# Patient Record
Sex: FEMALE | Race: WHITE | NOT HISPANIC OR LATINO | URBAN - METROPOLITAN AREA
[De-identification: names, ages, dates, MRNs, and addresses within clinical notes are randomized per-mention and may not be internally consistent; named-entity substitution may affect disease eponyms.]

---

## 2022-10-17 ENCOUNTER — OFFICE (OUTPATIENT)
Dept: URBAN - METROPOLITAN AREA CLINIC 105 | Facility: CLINIC | Age: 82
End: 2022-10-17

## 2022-10-17 VITALS
WEIGHT: 146 LBS | HEART RATE: 71 BPM | OXYGEN SATURATION: 98 % | HEIGHT: 63 IN | SYSTOLIC BLOOD PRESSURE: 109 MMHG | DIASTOLIC BLOOD PRESSURE: 61 MMHG

## 2022-10-17 DIAGNOSIS — K59.09 OTHER CONSTIPATION: ICD-10-CM

## 2022-10-17 DIAGNOSIS — K21.9 GASTRO-ESOPHAGEAL REFLUX DISEASE WITHOUT ESOPHAGITIS: ICD-10-CM

## 2022-10-17 PROCEDURE — 99204 OFFICE O/P NEW MOD 45 MIN: CPT

## 2022-10-17 RX ORDER — FAMOTIDINE 40 MG/1
40 TABLET, FILM COATED ORAL
Qty: 90 | Refills: 0 | Status: ACTIVE

## 2022-10-17 RX ORDER — SUCRALFATE ORAL 1 G/10ML
SUSPENSION ORAL
Qty: 400 | Refills: 4 | Status: COMPLETED
End: 2023-12-12

## 2022-10-17 NOTE — SERVICEHPINOTES
Sally Soliz   is seen for an initial visit today. Patient presents for evaluation of GERD and stomach issues. She has taken omeprazole for 4 to 5 years. She recently moved from [place] 3/2022. She did not feel like the medicine was working and she was switched to pantoprazole. She also tried different pill manufacturers without much improvement her symptoms. She previously received her GI care in [place], [place] with Dr. Villar. She has lost approximately 5 pounds in the last 2 weeks. She is currently taking pantoprazole daily with breakfast. She also takes over-the-counter Pepcid with lunch and dinner. She is also taking Carafate 4 times per day. She reports having acid burning sensation in her throat. She denies heartburn or dysphagia. She does endorse decreased appetite. She denies blood in stool or black stool. She does not use NSAIDs. She denies regurgitation or hoarseness. Occasionally with cough and runny nose. Also with soreness in the morning after eating. Some nights her symptoms are more severe than others. Symptoms are worse with lying flat. Also with a history of constipation for which she has tried MiraLAX and prune juice. She has previously been told that she has irritable bowel syndrome. She denies abdominal pain or bloating. She reports having severe diarrhea in 2020 and had an upper endoscopy and colonoscopy performed at that time (report unavailable for review). She was treated with rifaximin with significant improvement in her symptoms. She reports having a back surgery after a fall earlier this year. She denies family history of colon cancer or celiac disease. She has a granddaughter with Crohn's disease.br
brPatient was seen by Rohini Ramos 10/11/2022 complaining of worsening GERD symptoms. She had switched from omeprazole to pantoprazole with no significant relief. Also with chronic constipation. She was advised to take sucralfate liquid 4 times daily. She also had H. pylori testing that was reportedly negative.br

## 2022-10-17 NOTE — SERVICENOTES
– Ordered upper endoscopy with Bravo pH testing at [facility]
– Continue pantoprazole daily, 30 to 60 minutes before breakfast
– Sent in prescription for famotidine (Pepcid) 40 mg at bedtime for reflux
– Refilled sucralfate 10 mL 4 times daily as needed for heartburn
– Hold pantoprazole, famotidine and sucralfate 1 week before your procedure
– Take a fiber supplement daily
– Increase fiber and fluid intake
– Try Squatty Potty
– Continue MiraLAX for constipation, adjust dose for 1-2 soft bowel movements daily
– We will obtain records from your prior colonoscopy and upper endoscopy

## 2022-11-18 ENCOUNTER — ON CAMPUS - OUTPATIENT (OUTPATIENT)
Dept: URBAN - METROPOLITAN AREA MEDICAL CENTER 8 | Facility: MEDICAL CENTER | Age: 82
End: 2022-11-18
Payer: MEDICARE

## 2022-11-18 DIAGNOSIS — K44.9 DIAPHRAGMATIC HERNIA WITHOUT OBSTRUCTION OR GANGRENE: ICD-10-CM

## 2022-11-18 DIAGNOSIS — K21.00 GASTRO-ESOPHAGEAL REFLUX DISEASE WITH ESOPHAGITIS, WITHOUT B: ICD-10-CM

## 2022-11-18 DIAGNOSIS — K31.7 POLYP OF STOMACH AND DUODENUM: ICD-10-CM

## 2022-11-18 DIAGNOSIS — K22.2 ESOPHAGEAL OBSTRUCTION: ICD-10-CM

## 2022-11-18 DIAGNOSIS — K31.89 OTHER DISEASES OF STOMACH AND DUODENUM: ICD-10-CM

## 2022-11-18 DIAGNOSIS — K22.5 DIVERTICULUM OF ESOPHAGUS, ACQUIRED: ICD-10-CM

## 2022-11-18 PROCEDURE — 43239 EGD BIOPSY SINGLE/MULTIPLE: CPT

## 2022-11-18 PROCEDURE — 43450 DILATE ESOPHAGUS 1/MULT PASS: CPT | Mod: 51

## 2023-06-01 ENCOUNTER — OFFICE (OUTPATIENT)
Dept: URBAN - METROPOLITAN AREA CLINIC 105 | Facility: CLINIC | Age: 83
End: 2023-06-01

## 2023-10-11 ENCOUNTER — OFFICE (OUTPATIENT)
Dept: URBAN - METROPOLITAN AREA CLINIC 105 | Facility: CLINIC | Age: 83
End: 2023-10-11

## 2023-10-11 VITALS
DIASTOLIC BLOOD PRESSURE: 80 MMHG | WEIGHT: 148 LBS | SYSTOLIC BLOOD PRESSURE: 130 MMHG | HEIGHT: 63 IN | HEART RATE: 100 BPM | OXYGEN SATURATION: 95 %

## 2023-10-11 DIAGNOSIS — K59.09 OTHER CONSTIPATION: ICD-10-CM

## 2023-10-11 DIAGNOSIS — K44.9 DIAPHRAGMATIC HERNIA WITHOUT OBSTRUCTION OR GANGRENE: ICD-10-CM

## 2023-10-11 DIAGNOSIS — K21.9 GASTRO-ESOPHAGEAL REFLUX DISEASE WITHOUT ESOPHAGITIS: ICD-10-CM

## 2023-10-11 PROCEDURE — 99214 OFFICE O/P EST MOD 30 MIN: CPT

## 2023-10-11 RX ORDER — SUCRALFATE 1 G/1
TABLET ORAL
Qty: 60 | Refills: 9 | Status: ACTIVE
Start: 2023-10-11

## 2023-10-11 NOTE — SERVICENOTES
we will hold esomeprazole for one week--see if less dizzy, if reflux worsens, can take famotidine twice  a day.

If still dizzy, restart esomeprazole and hold famotidine, see if less dizzy

You may need to let your PCP know
I will send for sucralfate tablets instead of liquid, this should be more affordable.

## 2023-10-11 NOTE — SERVICEHPINOTES
Sally Soliz   is seen today for a follow-up visit.  
br   OV 6/1/23 Dr. Antunez 
brPatient is an 82-year-old woman who presents today for follow-up of GERD and hiatal hernia.She has reflux symptoms, which are well controlled on famotidine, pantoprazole, and sucralfate. Her stool is on the looser side and takes MiraLAX every other day. She has not tried fiber supplements. She has history of osteoporosis and is receiving injections. She has had 5 back surgeries. She denies any changes in health history since last seen. Denies dysphagia. Denies diarrhea. Denies blood in stool or black stool. Denies family history of colon cancerPatient presents for follow-up of GERD and hiatal hernia. Patient initially seen in clinic 10/2022. At that time she had reported issues with GERD. She was taking pantoprazole daily with breakfast and OTC Pepcid with lunch and dinner. Also with a history of constipation for which she had tried MiraLAX and prune juice. She was previously told that she had irritable bowel syndrome. Patient was advised to continue pantoprazole daily and to take famotidine 40 mg at bedtime. Also refilled sucralfate 4 times daily. She was advised to take a fiber supplement, try squatty potty and continue MiraLAX for constipation. She had an EGD 11/2022 with LA grade C esophagitis, 1 benign stricture at the GE junction that was dilated with a 50 Maori dilator, 7 cm hiatal hernia, multiple gastric polyps, mild antral erythema, small diverticulum in the duodenal bulb and otherwise normal duodenum. Her pantoprazole was increased to 40 mg twice daily and she was advised to take famotidine 40 minutes at bedtime. She was also advised to continue sucralfate 4 times daily. 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 She was referred to Dr. Mckinney for possible hiatal hernia repair. She was minimally symptomatic at that time and wished to defer surgeryINTERVAL VISIT 10/11/23 
br   Patient states she is taking esomeprazole twice a day, sucralfate twice a day liquid, famotidine at bedtime and she bought a wedge pillow. She is pretty well controlled in regards to her reflux but has noticed since she has been on these medicines she has been dizzy from 11 AM until about 5 PM. She is wondering if these medicines are the trigger for her dizziness. She is also worried due to her history of osteoporosis and PPIs. And she is concerned because the box on her medications states do not take longer than 2 weeks.

## 2023-12-12 ENCOUNTER — OFFICE (OUTPATIENT)
Dept: URBAN - METROPOLITAN AREA CLINIC 105 | Facility: CLINIC | Age: 83
End: 2023-12-12

## 2023-12-12 VITALS
WEIGHT: 148 LBS | DIASTOLIC BLOOD PRESSURE: 60 MMHG | SYSTOLIC BLOOD PRESSURE: 111 MMHG | HEART RATE: 79 BPM | HEIGHT: 63 IN | OXYGEN SATURATION: 97 %

## 2023-12-12 DIAGNOSIS — R19.7 DIARRHEA, UNSPECIFIED: ICD-10-CM

## 2023-12-12 PROCEDURE — 99214 OFFICE O/P EST MOD 30 MIN: CPT
